# Patient Record
Sex: MALE | Employment: PART TIME | URBAN - METROPOLITAN AREA
[De-identification: names, ages, dates, MRNs, and addresses within clinical notes are randomized per-mention and may not be internally consistent; named-entity substitution may affect disease eponyms.]

---

## 2018-11-12 ENCOUNTER — OFFICE VISIT (OUTPATIENT)
Dept: URGENT CARE | Facility: CLINIC | Age: 23
End: 2018-11-12
Payer: COMMERCIAL

## 2018-11-12 VITALS
SYSTOLIC BLOOD PRESSURE: 112 MMHG | BODY MASS INDEX: 27.4 KG/M2 | DIASTOLIC BLOOD PRESSURE: 70 MMHG | RESPIRATION RATE: 16 BRPM | HEART RATE: 90 BPM | OXYGEN SATURATION: 96 % | HEIGHT: 69 IN | WEIGHT: 185 LBS | TEMPERATURE: 99.8 F

## 2018-11-12 DIAGNOSIS — B07.9 VIRAL WARTS, UNSPECIFIED TYPE: Primary | ICD-10-CM

## 2018-11-12 PROCEDURE — 99203 OFFICE O/P NEW LOW 30 MIN: CPT | Performed by: PHYSICIAN ASSISTANT

## 2018-11-12 ASSESSMENT — ENCOUNTER SYMPTOMS
CHILLS: 0
FEVER: 0
SORE THROAT: 0
VOMITING: 0
FATIGUE: 0
COUGH: 0
SHORTNESS OF BREATH: 0
WEAKNESS: 0
NUMBNESS: 0
ANOREXIA: 0
CONSTIPATION: 0
DIARRHEA: 0
NAUSEA: 0
ABDOMINAL PAIN: 0

## 2018-11-12 NOTE — PATIENT INSTRUCTIONS
Apply medication once a day with duct tape to occlude area.     Warts  Warts are small growths on the skin. They are common and can occur on various areas of the body. A person may have one wart or multiple warts. Most warts are not painful, and they usually do not cause problems. However, warts can cause pain if they are large or occur in an area of the body where pressure will be applied to them, such as the bottom of the foot.  In many cases, warts do not require treatment. They usually go away on their own over a period of many months to a couple years. Various treatments may be done for warts that cause problems or do not go away. Sometimes, warts go away and then come back again.  What are the causes?  Warts are caused by a type of virus that is called human papillomavirus (HPV). This virus can spread from person to person through direct contact. Warts can also spread to other areas of the body when a person scratches a wart and then scratches another area of his or her body.  What increases the risk?  Warts are more likely to develop in:  · People who are 10-20 years of age.  · People who have a weakened body defense system (immune system).  What are the signs or symptoms?  A wart may be round or oval or have an irregular shape. Most warts have a rough surface. Warts may range in color from skin color to light yellow, brown, or gray. They are generally less than ½ inch (1.3 cm) in size. Most warts are painless, but some can be painful when pressure is applied to them.  How is this diagnosed?  A wart can usually be diagnosed from its appearance. In some cases, a tissue sample may be removed (biopsy) to be looked at under a microscope.  How is this treated?  In many cases, warts do not need treatment. If treatment is needed, options may include:  · Applying medicated solutions, creams, or patches to the wart. These may be over-the-counter or prescription medicines that make the skin soft so that layers will  gradually shed away. In many cases, the medicine is applied one or two times per day and covered with a bandage.  · Putting duct tape over the top of the wart (occlusion). You will leave the tape in place for as long as told by your health care provider, then you will replace it with a new strip of tape. This is done until the wart goes away.  · Freezing the wart with liquid nitrogen (cryotherapy).  · Burning the wart with:  ¨ Laser treatment.  ¨ An electrified probe (electrocautery).  · Injection of a medicine (Candida antigen) into the wart to help the body's immune system to fight off the wart.  · Surgery to remove the wart.  Follow these instructions at home:  · Apply over-the-counter and prescription medicines only as told by your health care provider.  · Do not apply over-the-counter wart medicines to your face or genitals before you ask your health care provider if it is okay to do so.  · Do not scratch or pick at a wart.  · Wash your hands after you touch a wart.  · Avoid shaving hair that is over a wart.  · Keep all follow-up visits as told by your health care provider. This is important.  Contact a health care provider if:  · Your warts do not improve after treatment.  · You have redness, swelling, or pain at the site of a wart.  · You have bleeding from a wart that does not stop with light pressure.  · You have diabetes and you develop a wart.  This information is not intended to replace advice given to you by your health care provider. Make sure you discuss any questions you have with your health care provider.  Document Released: 09/27/2006 Document Revised: 05/31/2017 Document Reviewed: 03/14/2016  [employer] Interactive Patient Education © 2017 [employer]

## 2018-11-12 NOTE — PROGRESS NOTES
"Subjective:   Mandi Ortega is a 23 y.o. male who presents for Warts (pt states he noticed a bump on (R) hand x 2 months and started getting bigger)        Warts   This is a new problem. Episode onset: 2 months. The problem occurs constantly. Progression since onset: Increasing size. Pertinent negatives include no abdominal pain, anorexia, chills, congestion, coughing, fatigue, fever, nausea, numbness, rash, sore throat, vomiting or weakness. Nothing aggravates the symptoms. He has tried nothing for the symptoms.     Review of Systems   Constitutional: Negative for chills, fatigue, fever and malaise/fatigue.   HENT: Negative for congestion and sore throat.    Respiratory: Negative for cough and shortness of breath.    Gastrointestinal: Negative for abdominal pain, anorexia, constipation, diarrhea, nausea and vomiting.   Skin: Negative for rash.   Neurological: Negative for weakness and numbness.   All other systems reviewed and are negative.      PMH:  has no past medical history on file.  MEDS:   Current Outpatient Prescriptions:   •  Salicylic Acid 10 % Cream, 1 Application by Apply externally route every day for 10 days., Disp: 15 g, Rfl: 0  •  Ibuprofen 200 MG CAPS, Take  by mouth., Disp: , Rfl:   ALLERGIES: No Known Allergies  SURGHX: No past surgical history on file.  SOCHX:  reports that he has never smoked. He does not have any smokeless tobacco history on file.  No family history on file.     Objective:   /70 (BP Location: Left arm, Patient Position: Sitting, BP Cuff Size: Adult)   Pulse 90   Temp 37.7 °C (99.8 °F) (Temporal)   Resp 16   Ht 1.753 m (5' 9\")   Wt 83.9 kg (185 lb)   SpO2 96%   BMI 27.32 kg/m²     Physical Exam   Constitutional: He is oriented to person, place, and time. He appears well-developed and well-nourished. No distress.   HENT:   Head: Normocephalic and atraumatic.   Eyes: Pupils are equal, round, and reactive to light. Conjunctivae are normal.   Cardiovascular: Normal " rate and regular rhythm.    Pulmonary/Chest: Effort normal and breath sounds normal.   Musculoskeletal:        Right hand: He exhibits normal range of motion and no tenderness.        Hands:  Neurological: He is alert and oriented to person, place, and time.   Skin: Skin is warm and dry.   Psychiatric: He has a normal mood and affect. His behavior is normal.   Vitals reviewed.        Assessment/Plan:     1. Viral warts, unspecified type  Salicylic Acid 10 % Cream     Advised patient to start a trial of salicylic acid with occlusion with duct tape.  Liquid nitrogen unavailable in clinic today.  He can return to clinic if symptoms persist, consider calling ahead to clinic to find out if cryotherapy is available.  Educational handout on warts provided.    Consider asking primary care provider, follow-up with PCP 7-10 days. Red flags and emergency room precautions discussed.  Patient appears understanding of information.

## 2021-06-01 ENCOUNTER — HOSPITAL ENCOUNTER (EMERGENCY)
Dept: HOSPITAL 8 - ED | Age: 26
Discharge: HOME | End: 2021-06-01
Payer: COMMERCIAL

## 2021-06-01 VITALS — BODY MASS INDEX: 26.84 KG/M2 | HEIGHT: 69 IN | WEIGHT: 181.22 LBS

## 2021-06-01 VITALS — DIASTOLIC BLOOD PRESSURE: 69 MMHG | SYSTOLIC BLOOD PRESSURE: 129 MMHG

## 2021-06-01 DIAGNOSIS — S46.911A: Primary | ICD-10-CM
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
## 2021-06-01 PROCEDURE — 99283 EMERGENCY DEPT VISIT LOW MDM: CPT
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
## 2022-08-11 ENCOUNTER — APPOINTMENT (OUTPATIENT)
Dept: RADIOLOGY | Facility: MEDICAL CENTER | Age: 27
End: 2022-08-11
Attending: EMERGENCY MEDICINE
Payer: COMMERCIAL

## 2022-08-11 ENCOUNTER — HOSPITAL ENCOUNTER (EMERGENCY)
Facility: MEDICAL CENTER | Age: 27
End: 2022-08-11
Attending: EMERGENCY MEDICINE
Payer: COMMERCIAL

## 2022-08-11 VITALS
HEIGHT: 69 IN | WEIGHT: 185 LBS | RESPIRATION RATE: 16 BRPM | DIASTOLIC BLOOD PRESSURE: 76 MMHG | TEMPERATURE: 97 F | BODY MASS INDEX: 27.4 KG/M2 | OXYGEN SATURATION: 96 % | HEART RATE: 77 BPM | SYSTOLIC BLOOD PRESSURE: 120 MMHG

## 2022-08-11 DIAGNOSIS — S62.001A CLOSED NONDISPLACED FRACTURE OF SCAPHOID OF RIGHT WRIST, UNSPECIFIED PORTION OF SCAPHOID, INITIAL ENCOUNTER: ICD-10-CM

## 2022-08-11 DIAGNOSIS — S01.81XA FACIAL LACERATION, INITIAL ENCOUNTER: ICD-10-CM

## 2022-08-11 DIAGNOSIS — S09.90XA CLOSED HEAD INJURY, INITIAL ENCOUNTER: ICD-10-CM

## 2022-08-11 LAB
ABO + RH BLD: NORMAL
ABO GROUP BLD: NORMAL
ALBUMIN SERPL BCP-MCNC: 4.5 G/DL (ref 3.2–4.9)
ALBUMIN/GLOB SERPL: 1.9 G/DL
ALP SERPL-CCNC: 72 U/L (ref 30–99)
ALT SERPL-CCNC: 22 U/L (ref 2–50)
ANION GAP SERPL CALC-SCNC: 12 MMOL/L (ref 7–16)
APTT PPP: 24.8 SEC (ref 24.7–36)
AST SERPL-CCNC: 20 U/L (ref 12–45)
BILIRUB SERPL-MCNC: 1 MG/DL (ref 0.1–1.5)
BLD GP AB SCN SERPL QL: NORMAL
BUN SERPL-MCNC: 14 MG/DL (ref 8–22)
CALCIUM SERPL-MCNC: 9.1 MG/DL (ref 8.5–10.5)
CHLORIDE SERPL-SCNC: 105 MMOL/L (ref 96–112)
CO2 SERPL-SCNC: 20 MMOL/L (ref 20–33)
CREAT SERPL-MCNC: 1.01 MG/DL (ref 0.5–1.4)
ERYTHROCYTE [DISTWIDTH] IN BLOOD BY AUTOMATED COUNT: 38 FL (ref 35.9–50)
ETHANOL BLD-MCNC: <10.1 MG/DL
GFR SERPLBLD CREATININE-BSD FMLA CKD-EPI: 106 ML/MIN/1.73 M 2
GLOBULIN SER CALC-MCNC: 2.4 G/DL (ref 1.9–3.5)
GLUCOSE SERPL-MCNC: 123 MG/DL (ref 65–99)
HCT VFR BLD AUTO: 42.6 % (ref 42–52)
HGB BLD-MCNC: 15.3 G/DL (ref 14–18)
INR PPP: 1.09 (ref 0.87–1.13)
MCH RBC QN AUTO: 30.7 PG (ref 27–33)
MCHC RBC AUTO-ENTMCNC: 35.9 G/DL (ref 33.7–35.3)
MCV RBC AUTO: 85.5 FL (ref 81.4–97.8)
PLATELET # BLD AUTO: 289 K/UL (ref 164–446)
PMV BLD AUTO: 9.4 FL (ref 9–12.9)
POTASSIUM SERPL-SCNC: 3.8 MMOL/L (ref 3.6–5.5)
PROT SERPL-MCNC: 6.9 G/DL (ref 6–8.2)
PROTHROMBIN TIME: 14 SEC (ref 12–14.6)
RBC # BLD AUTO: 4.98 M/UL (ref 4.7–6.1)
RH BLD: NORMAL
SODIUM SERPL-SCNC: 137 MMOL/L (ref 135–145)
WBC # BLD AUTO: 7.1 K/UL (ref 4.8–10.8)

## 2022-08-11 PROCEDURE — 307740 HCHG GREEN TRAUMA TEAM SERVICES

## 2022-08-11 PROCEDURE — 71045 X-RAY EXAM CHEST 1 VIEW: CPT

## 2022-08-11 PROCEDURE — 86850 RBC ANTIBODY SCREEN: CPT

## 2022-08-11 PROCEDURE — 85730 THROMBOPLASTIN TIME PARTIAL: CPT

## 2022-08-11 PROCEDURE — 86901 BLOOD TYPING SEROLOGIC RH(D): CPT

## 2022-08-11 PROCEDURE — 72128 CT CHEST SPINE W/O DYE: CPT

## 2022-08-11 PROCEDURE — 72131 CT LUMBAR SPINE W/O DYE: CPT

## 2022-08-11 PROCEDURE — 70450 CT HEAD/BRAIN W/O DYE: CPT

## 2022-08-11 PROCEDURE — 85027 COMPLETE CBC AUTOMATED: CPT

## 2022-08-11 PROCEDURE — 700117 HCHG RX CONTRAST REV CODE 255: Performed by: EMERGENCY MEDICINE

## 2022-08-11 PROCEDURE — 71260 CT THORAX DX C+: CPT

## 2022-08-11 PROCEDURE — 36415 COLL VENOUS BLD VENIPUNCTURE: CPT

## 2022-08-11 PROCEDURE — 99285 EMERGENCY DEPT VISIT HI MDM: CPT

## 2022-08-11 PROCEDURE — 73130 X-RAY EXAM OF HAND: CPT | Mod: RT

## 2022-08-11 PROCEDURE — 85610 PROTHROMBIN TIME: CPT

## 2022-08-11 PROCEDURE — 80053 COMPREHEN METABOLIC PANEL: CPT

## 2022-08-11 PROCEDURE — 86900 BLOOD TYPING SEROLOGIC ABO: CPT

## 2022-08-11 PROCEDURE — 72125 CT NECK SPINE W/O DYE: CPT

## 2022-08-11 PROCEDURE — 82077 ASSAY SPEC XCP UR&BREATH IA: CPT

## 2022-08-11 RX ORDER — LIDOCAINE HYDROCHLORIDE 20 MG/ML
20 INJECTION, SOLUTION INFILTRATION; PERINEURAL ONCE
Status: DISCONTINUED | OUTPATIENT
Start: 2022-08-11 | End: 2022-08-11

## 2022-08-11 RX ADMIN — IOHEXOL 100 ML: 350 INJECTION, SOLUTION INTRAVENOUS at 10:00
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
## 2022-08-11 NOTE — ED NOTES
Patient left ED ambulatory and in NAD. Verbalized understanding of home monitoring, f/u information and further discharge instructions. Denies further questions at time of discharge.
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
## 2022-08-11 NOTE — DISCHARGE INSTRUCTIONS
Follow-up with orthopedics next week for reevaluation and definitive treatment of right scaphoid fracture.  Call 's office tomorrow, reference this emergency department visit and schedule an appointment for follow-up next week.  Follow-up with primary care or ED next week for reevaluation and wound check.    Keep splint clean and dry.  Nonweightbearing right upper extremity.  Rest, ice, elevate as needed for swelling or discomfort.    Keep wounds clean and dry.  Cleanse gently with warm water, soap, pat dry.  Avoid soaking wounds in water.  Antibiotic ointment twice daily.  Keep covered while outdoors are active.    Tylenol or ibuprofen as needed for discomfort.    Return to the emergency department for headache, altered mental status, visual changes, seizure, focal weakness, wound infection, hand/arm pain/swelling/discoloration/paresthesias or other new concerns.

## 2023-02-14 ENCOUNTER — OFFICE VISIT (OUTPATIENT)
Dept: URGENT CARE | Facility: CLINIC | Age: 28
End: 2023-02-14
Payer: COMMERCIAL

## 2023-02-14 VITALS
SYSTOLIC BLOOD PRESSURE: 102 MMHG | RESPIRATION RATE: 14 BRPM | HEIGHT: 69 IN | DIASTOLIC BLOOD PRESSURE: 68 MMHG | HEART RATE: 57 BPM | TEMPERATURE: 97.6 F | WEIGHT: 162 LBS | BODY MASS INDEX: 23.99 KG/M2 | OXYGEN SATURATION: 99 %

## 2023-02-14 DIAGNOSIS — K92.1 BLOODY STOOLS: ICD-10-CM

## 2023-02-14 PROCEDURE — 99203 OFFICE O/P NEW LOW 30 MIN: CPT | Performed by: PHYSICIAN ASSISTANT

## 2023-02-14 ASSESSMENT — FIBROSIS 4 INDEX: FIB4 SCORE: 0.37

## 2023-02-15 NOTE — PROGRESS NOTES
"Subjective:   Mandi Ortega is a 27 y.o. male who presents for Bloody Stools (X yesterday. )      HPI  The patient presents to the Urgent Care with complaints of blood in stool onset yesterday.  Blood in stool only when he is having a bowel movement.  He has had 2 episodes.  Did not notice a lot of blood in the toilet bowl water but on the stool.  Denies any constant bleeding or bleeding in the underwear.  He has no pain.  Denies any anal or rectal pain.  His stool was somewhat more hard than usual and he had somewhat straining this morning.  He did lift weights last week but does not usually lift weights. Denies any fever, abdominal pain, nausea, vomiting, urinary issues.  Denies any history of colitis or diverticulitis.  Tolerating fluids.  Normal appetite.          Medications:    escitalopram Tabs  Ibuprofen Caps    Allergies: Patient has no known allergies.    Problem List: Mandi Ortega does not have a problem list on file.    Surgical History:  No past surgical history on file.    Past Social Hx: Mandi Ortega  reports that he has never smoked. He has never used smokeless tobacco. He reports current alcohol use. He reports that he does not currently use drugs.     Past Family Hx:  Mandi Ortega family history is not on file.     Problem list, medications, and allergies reviewed by myself today in Epic.     Objective:     /68   Pulse (!) 57   Temp 36.4 °C (97.6 °F) (Temporal)   Resp 14   Ht 1.753 m (5' 9\")   Wt 73.5 kg (162 lb)   SpO2 99%   BMI 23.92 kg/m²     Physical Exam  Vitals reviewed.   Constitutional:       General: He is not in acute distress.     Appearance: Normal appearance. He is not ill-appearing or toxic-appearing.   Eyes:      Conjunctiva/sclera: Conjunctivae normal.      Pupils: Pupils are equal, round, and reactive to light.   Cardiovascular:      Rate and Rhythm: Normal rate.   Pulmonary:      Effort: Pulmonary effort is normal.   Abdominal:      General: Abdomen is " flat. Bowel sounds are normal. There is no distension.      Palpations: Abdomen is soft. There is no mass.      Tenderness: There is no abdominal tenderness. There is no guarding or rebound.   Genitourinary:     Prostate: Normal.      Rectum: Internal hemorrhoid (possible internal hemorrhoid palpated. no pain. no visible blood from digital rectal exam) present. No mass, tenderness, anal fissure or external hemorrhoid. Normal anal tone.   Musculoskeletal:      Cervical back: Neck supple.   Skin:     General: Skin is warm and dry.   Neurological:      General: No focal deficit present.      Mental Status: He is alert and oriented to person, place, and time.   Psychiatric:         Mood and Affect: Mood normal.         Behavior: Behavior normal.       Diagnosis and associated orders:     1. Bloody stools       Comments/MDM:     Discussed with patient his presenting symptoms and exam findings most likely due to internal hemorrhoid.  He is well-appearing in no acute distress.  Normal vital signs.  No abdominal pain.  No anal fissures or external thrombosed hemorrhoids.  Recommend symptomatic and supportive care at this time.  Increase fluid intake, increase fiber intake, stool softener, avoid straining or lifting.  If no improvement in 1 week patient is going to message me on [employer] for further instructions, possible labs, and possible referral to gastroenterology.  Otherwise, patient agreed to present to the ER if any worsening bleeding or any other concerns.       I personally reviewed prior external notes and test results pertinent to today's visit. Pathogenesis of diagnosis discussed including typical length and natural progression. Supportive care, natural history, differential diagnoses, and indications for immediate follow-up discussed. Patient expresses understanding and agrees to plan. Patient denies any other questions or concerns.     Follow-up with the primary care physician for recheck, reevaluation, and  consideration of further management.    Please note that this dictation was created using voice recognition software. I have made a reasonable attempt to correct obvious errors, but I expect that there are errors of grammar and possibly content that I did not discover before finalizing the note.    This note was electronically signed by Kurtis Angel PA-C

## 2024-04-09 ENCOUNTER — HOSPITAL ENCOUNTER (EMERGENCY)
Facility: MEDICAL CENTER | Age: 29
End: 2024-04-09
Attending: EMERGENCY MEDICINE
Payer: COMMERCIAL

## 2024-04-09 VITALS
SYSTOLIC BLOOD PRESSURE: 124 MMHG | BODY MASS INDEX: 23.02 KG/M2 | HEART RATE: 55 BPM | RESPIRATION RATE: 18 BRPM | WEIGHT: 155.87 LBS | OXYGEN SATURATION: 98 % | TEMPERATURE: 98.1 F | DIASTOLIC BLOOD PRESSURE: 76 MMHG

## 2024-04-09 DIAGNOSIS — Z76.0 MEDICATION REFILL: ICD-10-CM

## 2024-04-09 PROCEDURE — 99282 EMERGENCY DEPT VISIT SF MDM: CPT

## 2024-04-09 RX ORDER — ESCITALOPRAM OXALATE 10 MG/1
10 TABLET ORAL DAILY
Qty: 30 TABLET | Refills: 0 | Status: SHIPPED | OUTPATIENT
Start: 2024-04-09 | End: 2024-04-22 | Stop reason: SDUPTHER

## 2024-04-09 ASSESSMENT — FIBROSIS 4 INDEX: FIB4 SCORE: 0.38

## 2024-04-09 NOTE — ED TRIAGE NOTES
.Mandi Ortega  .  Chief Complaint   Patient presents with    Medication Refill     10mg lexapro     Patient to triage with above complaint. Patient recently changed insurance and has appt next month with new psychiatrist. Last dose was yesterday.     Patient to lobby and instructed to inform staff of any needs.

## 2024-04-09 NOTE — ED PROVIDER NOTES
ED Provider Note    CHIEF COMPLAINT  Chief Complaint   Patient presents with    Medication Refill     10mg lexapro         HPI/ROS  LIMITATION TO HISTORY   Select: : None  OUTSIDE HISTORIAN(S):  None    Mandi Ortega is a 28 y.o. male who presents requesting refill of Lexapro.  His last dose was today.  No acute complaint otherwise.  He states his doctor's appointment is in 2 weeks.    PAST MEDICAL HISTORY       SURGICAL HISTORY  patient denies any surgical history    FAMILY HISTORY  History reviewed. No pertinent family history.    SOCIAL HISTORY  Social History     Tobacco Use    Smoking status: Never    Smokeless tobacco: Never   Vaping Use    Vaping Use: Never used   Substance and Sexual Activity    Alcohol use: Yes     Comment: occ    Drug use: Yes     Types: Inhaled     Comment: marijuana    Sexual activity: Not on file       CURRENT MEDICATIONS  Home Medications       Reviewed by Deepthi Paulson R.N. (Registered Nurse) on 04/09/24 at 1529  Med List Status: Complete     Medication Last Dose Status   escitalopram (LEXAPRO) 10 MG Tab 4/8/2024 Active   Ibuprofen 200 MG CAPS  Active                    ALLERGIES  No Known Allergies    PHYSICAL EXAM  VITAL SIGNS: /67   Pulse (!) 54   Temp 35.9 °C (96.7 °F) (Temporal)   Resp 17   Wt 70.7 kg (155 lb 13.8 oz)   SpO2 99%   BMI 23.02 kg/m²    Neurologic: Speech clear, ambulates without assistance  Psychiatric: Normal mood.  Normal affect.  Cardiac: Bradycardic rate, regular rhythm  Respiratory: Clear lung sounds          COURSE & MEDICAL DECISION MAKING    ASSESSMENT, COURSE AND PLAN  Care Narrative: Patient without acute medical complaint at this time, last dose of his medication Lexapro was today and he is not able to see his doctor until 2 weeks.  From a psychiatric standpoint, he appears stable at this time, no complaints of suicidal or homicidal ideation.  Vital signs are normal, patient stable for discharge.    DISPOSITION AND  DISCUSSIONS    Escalation of care considered, and ultimately not performed:Laboratory analysis    Barriers to care at this time, including but not limited to:  Patient is unable to get an appointment with his primary doctor for the next 2 weeks. .     Decision tools and prescription drugs considered including, but not limited to: Medication modification no change in his medication dosage .    FINAL DIAGNOSIS  1. Medication refill           Electronically signed by: Nikhil Carrasquillo M.D., 4/9/2024 4:10 PM

## 2024-04-09 NOTE — ED NOTES
Discharge teaching and paperwork provided regarding treatments and all questions/concerns answered. VSS, assessment stable and pt was given information regarding home care and reasons to follow up with ED or primary MD. Patient provided Rx to  at their desired pharmacy. Patient discharged to the care of self and home out of the ED.

## 2024-04-22 ENCOUNTER — DOCUMENTATION (OUTPATIENT)
Dept: BEHAVIORAL HEALTH | Facility: CLINIC | Age: 29
End: 2024-04-22
Payer: COMMERCIAL

## 2024-04-22 ENCOUNTER — OFFICE VISIT (OUTPATIENT)
Dept: BEHAVIORAL HEALTH | Facility: CLINIC | Age: 29
End: 2024-04-22
Payer: COMMERCIAL

## 2024-04-22 VITALS
HEART RATE: 55 BPM | HEIGHT: 69 IN | WEIGHT: 153.8 LBS | SYSTOLIC BLOOD PRESSURE: 110 MMHG | OXYGEN SATURATION: 97 % | DIASTOLIC BLOOD PRESSURE: 64 MMHG | BODY MASS INDEX: 22.78 KG/M2

## 2024-04-22 DIAGNOSIS — F90.0 ADHD (ATTENTION DEFICIT HYPERACTIVITY DISORDER), INATTENTIVE TYPE: ICD-10-CM

## 2024-04-22 DIAGNOSIS — Z76.0 MEDICATION REFILL: ICD-10-CM

## 2024-04-22 DIAGNOSIS — F40.10 SOCIAL ANXIETY DISORDER: ICD-10-CM

## 2024-04-22 PROCEDURE — 3078F DIAST BP <80 MM HG: CPT | Performed by: PSYCHIATRY & NEUROLOGY

## 2024-04-22 PROCEDURE — 3074F SYST BP LT 130 MM HG: CPT | Performed by: PSYCHIATRY & NEUROLOGY

## 2024-04-22 PROCEDURE — 99204 OFFICE O/P NEW MOD 45 MIN: CPT | Performed by: PSYCHIATRY & NEUROLOGY

## 2024-04-22 RX ORDER — METHYLPHENIDATE HYDROCHLORIDE 18 MG/1
18 TABLET ORAL EVERY MORNING
Qty: 30 TABLET | Refills: 0 | Status: SHIPPED | OUTPATIENT
Start: 2024-04-22 | End: 2024-05-22

## 2024-04-22 RX ORDER — ESCITALOPRAM OXALATE 10 MG/1
10 TABLET ORAL DAILY
Qty: 30 TABLET | Refills: 1 | Status: SHIPPED | OUTPATIENT
Start: 2024-04-22

## 2024-04-22 ASSESSMENT — FIBROSIS 4 INDEX: FIB4 SCORE: 0.38

## 2024-04-22 NOTE — PROGRESS NOTES
INITIAL PSYCHIATRY EVALUATION        History Of Present Illness:  Mandi Ortega is a 28 y.o. male with history of depression referred by PCP comes in today to establish care and for evaluation of anxiety, was receiving care through [facility], prescribed lexapro 10mg for anxiety from Feb 2020 until Dec 2023 but now needs to establish care b/c his insurance dropped [facility].      Dx anxiety in 2020 but has had symptoms since childhood:  butterflies in his stomach, can't think straight when nervous, heart racing, worse when talking to new people, social settings, before sports practice or events for class, performance anxiety.  No h/o panic attacks. Worse symptoms in high school, would avoid football practice.      Depression: Worse in Wintertime, seasonal, longest duration is 2wk: hopeless, lost, feels he doesn't know what he's doing in life, anergia, no passion for doing things he needs to do, anhedonia.  Happens every year, comes and goes throughout the Winter.     ADHD:  never dx but fiddles a lot, difficulty focusing on certain days, grades were a struggle in middle and high school, but in college he developed a passion for education, goofed off in class, does not recall being hyper,  inattentive, often has 'lots of thoughts' in his head that aren't anxious :  needing to do certain tasks getting easily distracted etc.  (See chart)    Current psychiatric medications - Lexapro 10mg qam    Past Psychiatric History:  Denies history of suicide attempt or prior inpatient psychiatry hospitalization.  Prior diagnosis - Anxiety  Provider - Dominique Max at [facility] - x 1.5 years working on getting better at socializing, CBT  Prior psychiatric hospitalization - denies  Self harm/suicide attempt - denies  Previous medication trials - denies    Safety Assessment-Self:  Does patient acknowledge current or past symptoms of dangerousness to self? no  Does parent/significant other report patient has  current or past symptoms of dangerousness to self? no  Does presenting problem suggest symptoms of dangerousness to self? No    Safety Assessment-Others:  Does patient acknowledge current or past symptoms of aggressive behavior or risk to others? no  Does parent/significant other report patient has current or past symptoms of aggressive behavior or risk to others?  no  Does presenting problem suggest symptoms of dangerousness to others? No     Current Safety/Relapse Assessment:       Suicidal: Low       Homicidal: Low       Self-Harm: Low       Relapse: Not applicable       Crisis Safety Plan: n/a  Reviewed Not Indicated    Past Medical/Surgical History:  No past medical history on file.  No past surgical history on file.    Family Psychiatric History:  Anxiety:  panic attacks (father); brother;   Depression: (father, mother, brother)  Bipolar denies  Schizophrenia denies  ADHD denies  Substance use: father in recovery for alcoholism and pain killers    Substance Use/Addiction History:  Alcohol - last night, drinks once every 1-2 months  Nicotine - denies  Cannabis - daily after work, helps him unwind. First age 10th grade, heaviest use was 2 yr ago and was smoking all day.   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 Has worked other jobs: , [employer] and side jobs.  Attends [employer] jonatan after transferring from [employer], majoring in history, starts in the Fall.  [employer], where he grew up.  AA from [employer].  Unsure of his future career plans, maybe teaching high school or college.  Parents  when he was 15. Brother is 19, lives together, working at [employer]. Single. Heterosexual.  No children.  Fa in [place], mom is in [facility]. Does not have any friends, except for his brother, no problems making new friends.  Travels a  "lot and stays at hostels.    Allergies:  Patient has no known allergies.    Medications:  Current Outpatient Medications   Medication Sig Dispense Refill    escitalopram (LEXAPRO) 10 MG Tab Take 1 Tablet by mouth every day. 30 Tablet 0    Ibuprofen 200 MG CAPS Take  by mouth.       No current facility-administered medications for this visit.       Review of Symptoms:  Constitutional - Positive for fatigue  Psychiatric - Positive for anxiety    Physical Examination:  Vital signs: /64 (BP Location: Right arm, Patient Position: Sitting)   Pulse (!) 55   Ht 1.753 m (5' 9\")   Wt 69.8 kg (153 lb 12.8 oz)   SpO2 97%   BMI 22.71 kg/m²     Musculoskeletal: Normal gait. No abnormal movements.     Mental Status Evaluation:   General:, dressed in casual attire, good grooming and hygiene, in no apparent distress, calm and cooperative, good eye contact, no psychomotor agitation or retardation  Orientation: Alert and oriented to person, place and time  Recent and remote memory: Intact  Attention span and concentration: Intact  Speech: Spontaneous, normal rate, rhythm and tone  Thought Process: Linear, logical and goal directed  Thought Content: Denies suicidal or homicidal ideations, intent or plan  Perception: Denies auditory or visual hallucinations. No delusions noted  Associations: Intact  Language: Appropriate  Fund of knowledge and vocabulary: Adequate  Mood: \"good\"  Affect: full, mood congruent  Insight: Good  Judgment: Good    Depression screening:       No data to display                Interpretation of PHQ-9 Total Score   Score Severity   1-4 No Depression   5-9 Mild Depression   10-14 Moderate Depression   15-19 Moderately Severe Depression   20-27 Severe Depression    Anxiety screening:       No data to display                Interpretation of ANNI 7 Total Score   Score Severity:  0-4 No Anxiety   5-9 Mild Anxiety  10-14 Moderate Anxiety  15-21 Severe Anxiety    Medical Records/Labs/Diagnostic Tests " Reviewed:  [place] PDMP records - reviewed    Impression:  1.  29 y/o male with social anxiety disorder presents with good response to cognitive behavioral therapy and treatment with escitalopram.  Also meets criteria for ADHD IT given ADHD adult screen and history.   Bilateral family history of depression with resulting seasonal depressed mood which might benefit from intervention (raise escitalopram dose for Winter months), and paternal FH of anxiety and substance abuse, place patient at higher risk of these conditions.  Socially, tends to avoid social gatherings as is consistent with his anxiety disorder but has good insight and is working on this in therapy.     Plan:  1.  Cont escitalopram 10mg qd for social anxiety  2. Start Concerta 18mg qam ADHD IT  3. Cont CBT at [facility]  4. Encouraged pt to get a primary care provider who can likely prescribe his medication moving forward.  5. Labs - ordered baseline labs  6. RTC 1 mo    Return to clinic in 1 mo or sooner if symptoms worsen    The proposed treatment plan was discussed with the patient who was provided the opportunity to ask questions and make suggestions regarding alternative treatment. Patient verbalized understanding and expressed agreement with the plan.     Total time spent on the day of encounter: 65 minutes.     Thank you for allowing me to participate in the care of this patient.    Amber Pandya M.D.  04/22/24    CC:   Pcp Pt States None

## 2024-05-20 ENCOUNTER — OFFICE VISIT (OUTPATIENT)
Dept: BEHAVIORAL HEALTH | Facility: CLINIC | Age: 29
End: 2024-05-20
Payer: COMMERCIAL

## 2024-05-20 VITALS
HEART RATE: 61 BPM | DIASTOLIC BLOOD PRESSURE: 74 MMHG | BODY MASS INDEX: 22.03 KG/M2 | OXYGEN SATURATION: 100 % | WEIGHT: 149.2 LBS | SYSTOLIC BLOOD PRESSURE: 102 MMHG

## 2024-05-20 DIAGNOSIS — F90.0 ADHD (ATTENTION DEFICIT HYPERACTIVITY DISORDER), INATTENTIVE TYPE: ICD-10-CM

## 2024-05-20 DIAGNOSIS — R42 ORTHOSTATIC DIZZINESS: ICD-10-CM

## 2024-05-20 DIAGNOSIS — Z72.4 EATING PROBLEM: ICD-10-CM

## 2024-05-20 PROCEDURE — 3078F DIAST BP <80 MM HG: CPT | Performed by: PSYCHIATRY & NEUROLOGY

## 2024-05-20 PROCEDURE — 3074F SYST BP LT 130 MM HG: CPT | Performed by: PSYCHIATRY & NEUROLOGY

## 2024-05-20 PROCEDURE — 99214 OFFICE O/P EST MOD 30 MIN: CPT | Performed by: PSYCHIATRY & NEUROLOGY

## 2024-05-20 RX ORDER — METHYLPHENIDATE HYDROCHLORIDE 18 MG/1
18 TABLET ORAL EVERY MORNING
Qty: 30 TABLET | Refills: 0 | Status: SHIPPED | OUTPATIENT
Start: 2024-05-20 | End: 2024-05-21 | Stop reason: SDUPTHER

## 2024-05-20 ASSESSMENT — FIBROSIS 4 INDEX: FIB4 SCORE: 0.38

## 2024-05-20 NOTE — PROGRESS NOTES
PSYCHIATRY FOLLOW-UP NOTE    No chief complaint on file.      History Of Present Illness:  Mandi is a 27 y/o male with social anxiety disorder and ADHD IT last seen 4w ago, presents for f/u.  Taking Concerta daily, noticing improved energy, and possible increase in focus but very subtle, days when he still has a hard time focusing.  Still working at the [employer] daily, more motivated to get stuff done, is running now 3 miles a day and really likes it (used to run track in high school), cooking for himself more and meal prepping, no longer sitting at home and getting outdoors.  School starts the last week of August.  Sleeping 'pretty good' approx 8 hr sleep.  Appetite is lower, had stomach aches upon starting (149.2# today, was 155# about one mo ago).     Spends a lot of time thinking about his body, worries about what he eats, tries to eat health, worries about 'cheat meals.'  Never saw a dietitian. Has had experience of excessive exercise after eating.  No syncopal episodes.  Was obese as a highschooler, and worries about returning to being overweight.     Social History:   No changes    Substance Use:  Alcohol - last 5/19/24, one white claw, on average one drink per few weeks  Nicotine - stopped vaping, threw away all weed vapes  Cannabis - gummy once a week  Illicit drugs - denies    Past Medication Trials:  See chart    Medications:  Current Outpatient Medications   Medication Sig Dispense Refill    escitalopram (LEXAPRO) 10 MG Tab Take 1 Tablet by mouth every day. 30 Tablet 1    methylphenidate (CONCERTA) 18 MG CR tablet Take 1 Tablet by mouth every morning for 30 days. Indications: Attention Deficit Hyperactivity Disorder 30 Tablet 0    Ibuprofen 200 MG CAPS Take  by mouth.       No current facility-administered medications for this visit.       Review Of Systems:    Constitutional - Positive for fatigue  Psychiatric - Positive for mild anxiety    Physical Examination:  Vital signs: There were no vitals  "taken for this visit.    Musculoskeletal: Normal gait. No abnormal movements.     Mental Status Evaluation:   General:  dressed in casual attire, good grooming and hygiene, in no apparent distress, calm and cooperative, good eye contact, no psychomotor agitation or retardation  Orientation: Alert and oriented to person, place and time  Recent and remote memory: Grossly intact  Attention span and concentration: Grossly intact  Speech: Spontaneous, normal rate, rhythm and tone  Thought Process: Linear, logical and goal directed  Thought Content: Denies suicidal or homicidal ideations, intent or plan  Perception: Denies auditory or visual hallucinations. No delusions noted  Associations: Intact  Language: Appropriate  Fund of knowledge and vocabulary: Grossly adequate  Mood: \"good\"  Affect: full, mood congruent  Insight: Good  Judgment: Good    Depression screening:       No data to display                Interpretation of PHQ-9 Total Score   Score Severity   1-4 No Depression   5-9 Mild Depression   10-14 Moderate Depression   15-19 Moderately Severe Depression   20-27 Severe Depression    Anxiety screening:       No data to display                Interpretation of ANNI 7 Total Score   Score Severity:  0-4 No Anxiety   5-9 Mild Anxiety  10-14 Moderate Anxiety  15-21 Severe Anxiety    Medical Records/Labs/Diagnostic Tests Reviewed:  [place] [facility] records - reviewed      Impression:  Jennifer Raymond is a 27 y/o male with social anxiety disorder and ADHD IT with anxiety slightly increased from starting concerta but manageable and has improved with time.  ADHD sx of procrastination and amotivation have improved with concerta.  Will cont to watch given h/o body image struggles with recent 6# weight loss to ensure avoidance of eating disordered behaviors.    Plan:  1. Cont lexapro 10mg qd and concerta 18mg qd  2. Labs - will go to any [facility]  3. Dietitian - will refer via [facility]  4. Therapy - cont with current provider, talk with her " about history of body image, teasing and how much time he spends thinking about body/food.   5. RTC 4 wk    Return to clinic in 4 wk or sooner if symptoms worsen    The proposed treatment plan was discussed with the patient who was provided the opportunity to ask questions and make suggestions regarding alternative treatment. Patient verbalized understanding and expressed agreement with the plan.     Total time spent on the day of encounter: 38 minutes.     Amber Pandya M.D.  05/20/24    This note was created using voice recognition software (Dragon). The accuracy of the dictation is limited by the abilities of the software. I have reviewed the note prior to signing, however some errors in grammar and context are still possible. If you have any questions related to this note please do not hesitate to contact our office.

## 2024-05-21 DIAGNOSIS — F90.0 ADHD (ATTENTION DEFICIT HYPERACTIVITY DISORDER), INATTENTIVE TYPE: ICD-10-CM

## 2024-05-21 RX ORDER — METHYLPHENIDATE HYDROCHLORIDE 18 MG/1
18 TABLET ORAL EVERY MORNING
Qty: 30 TABLET | Refills: 0 | Status: SHIPPED | OUTPATIENT
Start: 2024-05-22 | End: 2024-06-21

## 2024-05-21 NOTE — TELEPHONE ENCOUNTER
Sally Pandya pt. NGUYEN is not showing as registered with the state. Pharmacy would like a new script sent to process prescription. Can you please send it?    Received request via: Pharmacy    Was the patient seen in the last year in this department? Yes    Does the patient have an active prescription (recently filled or refills available) for medication(s) requested? No    Pharmacy Name: [facility]    Does the patient have nursing home Plus and need 100 day supply (blood pressure, diabetes and cholesterol meds only)? Medication is not for cholesterol, blood pressure or diabetes and Patient does not have SCP

## 2024-06-17 ENCOUNTER — APPOINTMENT (OUTPATIENT)
Dept: BEHAVIORAL HEALTH | Facility: CLINIC | Age: 29
End: 2024-06-17
Payer: COMMERCIAL

## 2024-06-24 ENCOUNTER — OFFICE VISIT (OUTPATIENT)
Dept: BEHAVIORAL HEALTH | Facility: CLINIC | Age: 29
End: 2024-06-24
Payer: COMMERCIAL

## 2024-06-24 VITALS
SYSTOLIC BLOOD PRESSURE: 105 MMHG | OXYGEN SATURATION: 99 % | BODY MASS INDEX: 22.48 KG/M2 | HEART RATE: 71 BPM | DIASTOLIC BLOOD PRESSURE: 71 MMHG | WEIGHT: 152.2 LBS

## 2024-06-24 DIAGNOSIS — Z76.0 MEDICATION REFILL: ICD-10-CM

## 2024-06-24 PROCEDURE — 3078F DIAST BP <80 MM HG: CPT | Performed by: PSYCHIATRY & NEUROLOGY

## 2024-06-24 PROCEDURE — 99214 OFFICE O/P EST MOD 30 MIN: CPT | Performed by: PSYCHIATRY & NEUROLOGY

## 2024-06-24 PROCEDURE — 3074F SYST BP LT 130 MM HG: CPT | Performed by: PSYCHIATRY & NEUROLOGY

## 2024-06-24 RX ORDER — METHYLPHENIDATE HYDROCHLORIDE 18 MG/1
18 TABLET ORAL EVERY MORNING
Qty: 30 TABLET | Refills: 0 | Status: SHIPPED | OUTPATIENT
Start: 2024-06-24 | End: 2024-07-24

## 2024-06-24 RX ORDER — METHYLPHENIDATE HYDROCHLORIDE 18 MG/1
18 TABLET ORAL EVERY MORNING
Qty: 30 TABLET | Refills: 0 | Status: SHIPPED | OUTPATIENT
Start: 2024-07-24 | End: 2024-08-23

## 2024-06-24 RX ORDER — ESCITALOPRAM OXALATE 10 MG/1
10 TABLET ORAL DAILY
Qty: 90 EACH | Refills: 1 | Status: SHIPPED | OUTPATIENT
Start: 2024-06-24

## 2024-06-24 ASSESSMENT — FIBROSIS 4 INDEX: FIB4 SCORE: 0.38

## 2024-06-24 NOTE — PROGRESS NOTES
PSYCHIATRY FOLLOW-UP NOTE    No chief complaint on file.      History Of Present Illness:  Mandi Ortega is a 28 y.o. male with ADHD, social anxiety comes in today for follow up, was last seen 1 ago.  Since last visit, has gained about 4 #, is exercising 6x per week, and runs every other day, is not preoccupied with food/eating, allows one cheat day per week.  Denies counting calories or restricting, denies use of laxatives or diuretics, or bingeing. Running about 2 miles q o d.    ADHD symptoms improved with Concerta, he notices a significant difference when he is not taking the medication, mind wanders less.  Working still, 65 hrs last week.  Sleeping 'pretty good.'  Appetite is low, but intentionally eating so he does not lose weight.     Anxiety is better then it was last month, but overall it comes and goes. When he experiences anxiety c/o butterflies in stomach, difficulty organizing his thoughts, triggered by random events but used to be related to performance.     Social History:   Living with brother, working, getting along well with sibling.     Substance Use:  Alcohol - denies  Nicotine - occasional, q o day  Cannabis - occasional q o d  Illicit drugs - denies    Past Medication Trials:  See chart    Medications:  Current Outpatient Medications   Medication Sig Dispense Refill    escitalopram (LEXAPRO) 10 MG Tab Take 1 Tablet by mouth every day. 30 Tablet 1    Ibuprofen 200 MG CAPS Take  by mouth.       No current facility-administered medications for this visit.       Review Of Systems:    Constitutional - Positive for fatigue  Psychiatric - Positive for anxiety, ADHD.    Physical Examination:  Vital signs: There were no vitals taken for this visit.    Musculoskeletal: Normal gait. No abnormal movements.     Mental Status Evaluation:   General:  dressed in casual attire, good grooming and hygiene, in no apparent distress, calm and cooperative, good eye contact, no psychomotor agitation or  "retardation  Orientation: Alert and oriented to person, place and time  Recent and remote memory: Grossly intact  Attention span and concentration: Grossly intact  Speech: Spontaneous, normal rate, rhythm and tone  Thought Process: Linear, logical and goal directed  Thought Content: Denies suicidal or homicidal ideations, intent or plan  Perception: Denies auditory or visual hallucinations. No delusions noted  Associations: Intact  Language: Appropriate  Fund of knowledge and vocabulary: Grossly adequate  Mood: \"pretty good\"  Affect: full, mood congruent  Insight: Good  Judgment: Good    Depression screening:       No data to display                Interpretation of PHQ-9 Total Score   Score Severity   1-4 No Depression   5-9 Mild Depression   10-14 Moderate Depression   15-19 Moderately Severe Depression   20-27 Severe Depression    Anxiety screening:       No data to display                Interpretation of ANNI 7 Total Score   Score Severity:  0-4 No Anxiety   5-9 Mild Anxiety  10-14 Moderate Anxiety  15-21 Severe Anxiety    Medical Records/Labs/Diagnostic Tests Reviewed:  [place] [facility] records - reviewed      Impression:  1Aguila Raymond is a 27 y/o male with social anxiety disorder and ADHD IT doing well with current regimen of medication and therapy.   ADHD sx of procrastination and amotivation have improved with concerta.  Will cont to watch given h/o body image struggles.     Plan:  1. Cont lexapro 10mg qd and concerta 18mg qd.  Will send in Rx for 2 mo sufficient.  2. Labs - will go to any [facility], pt forgot but will try to go before next appt  3. Dietitian - will refer via [employer]  4. Therapy - cont with Dominique Max q o week, they did speak about his past body image struggles and will cont to monitor, no acute concerns at this time.   5. RTC 4 wk    Return to clinic in 2 mo or sooner if symptoms worsen, will transfer case to resident physician on Monday mornings and writer will supervise.     The proposed treatment plan " was discussed with the patient who was provided the opportunity to ask questions and make suggestions regarding alternative treatment. Patient verbalized understanding and expressed agreement with the plan.     Total time spent on the day of encounter: 30 minutes.     Amber Pandya M.D.  06/24/24    This note was created using voice recognition software (Dragon). The accuracy of the dictation is limited by the abilities of the software. I have reviewed the note prior to signing, however some errors in grammar and context are still possible. If you have any questions related to this note please do not hesitate to contact our office.

## 2024-08-05 ENCOUNTER — OFFICE VISIT (OUTPATIENT)
Dept: BEHAVIORAL HEALTH | Facility: CLINIC | Age: 29
End: 2024-08-05
Payer: COMMERCIAL

## 2024-08-05 DIAGNOSIS — F90.0 ADHD (ATTENTION DEFICIT HYPERACTIVITY DISORDER), INATTENTIVE TYPE: ICD-10-CM

## 2024-08-05 DIAGNOSIS — F40.10 SOCIAL ANXIETY DISORDER: ICD-10-CM

## 2024-08-05 DIAGNOSIS — Z76.0 MEDICATION REFILL: ICD-10-CM

## 2024-08-05 PROCEDURE — 99214 OFFICE O/P EST MOD 30 MIN: CPT | Mod: GC

## 2024-08-05 RX ORDER — ESCITALOPRAM OXALATE 10 MG/1
10 TABLET ORAL DAILY
Qty: 90 TABLET | Refills: 1 | Status: SHIPPED | OUTPATIENT
Start: 2024-08-05

## 2024-08-05 RX ORDER — METHYLPHENIDATE HYDROCHLORIDE 18 MG/1
18 TABLET ORAL EVERY MORNING
Qty: 30 TABLET | Refills: 0 | Status: SHIPPED | OUTPATIENT
Start: 2024-09-04 | End: 2024-10-04

## 2024-08-05 RX ORDER — METHYLPHENIDATE HYDROCHLORIDE 18 MG/1
18 TABLET ORAL EVERY MORNING
Qty: 30 TABLET | Refills: 0 | Status: SHIPPED | OUTPATIENT
Start: 2024-08-05 | End: 2024-09-04

## 2024-08-05 ASSESSMENT — ENCOUNTER SYMPTOMS
SHORTNESS OF BREATH: 0
GASTROINTESTINAL NEGATIVE: 1
CONSTITUTIONAL NEGATIVE: 1
NAUSEA: 0
RESPIRATORY NEGATIVE: 1
CONSTIPATION: 0
VOMITING: 0
NEUROLOGICAL NEGATIVE: 1
CARDIOVASCULAR NEGATIVE: 1
DIARRHEA: 0
HEADACHES: 0
WEAKNESS: 0
TINGLING: 0

## 2024-08-05 ASSESSMENT — PATIENT HEALTH QUESTIONNAIRE - PHQ9
SUM OF ALL RESPONSES TO PHQ QUESTIONS 1-9: 8
CLINICAL INTERPRETATION OF PHQ2 SCORE: 2
5. POOR APPETITE OR OVEREATING: 0 - NOT AT ALL

## 2024-08-05 ASSESSMENT — ANXIETY QUESTIONNAIRES
1. FEELING NERVOUS, ANXIOUS, OR ON EDGE: NOT AT ALL
4. TROUBLE RELAXING: MORE THAN HALF THE DAYS
6. BECOMING EASILY ANNOYED OR IRRITABLE: SEVERAL DAYS
3. WORRYING TOO MUCH ABOUT DIFFERENT THINGS: SEVERAL DAYS
GAD7 TOTAL SCORE: 5
5. BEING SO RESTLESS THAT IT IS HARD TO SIT STILL: NOT AT ALL
2. NOT BEING ABLE TO STOP OR CONTROL WORRYING: SEVERAL DAYS
7. FEELING AFRAID AS IF SOMETHING AWFUL MIGHT HAPPEN: NOT AT ALL
IF YOU CHECKED OFF ANY PROBLEMS ON THIS QUESTIONNAIRE, HOW DIFFICULT HAVE THESE PROBLEMS MADE IT FOR YOU TO DO YOUR WORK, TAKE CARE OF THINGS AT HOME, OR GET ALONG WITH OTHER PEOPLE: SOMEWHAT DIFFICULT

## 2024-08-05 NOTE — PROGRESS NOTES
[facility] OUTPATIENT  PSYCHIATRIC EVALUATION    A full psychiatric evaluation was performed due to transition of care to new resident/fellow physician. All elements of a psychiatric evaluation were reviewed to ensure safe and effective care with transition.     Evaluation completed by: Delbert Eisenberg M.D.   Date of Service: 08/05/2024  Appointment type: in-office appointment.  Attending:  Amber Pandya M.D.   Information below was collected from: patient    CHIEF COMPLIANT:  Psychiatric Evaluation (Transfer of care)      HPI:   Mandi Ortega is a 28 y.o. old male with hx of social anxiety, ADHD IT, and seasonal affective d/o who presents today for new psychiatric evaluation for the assessment of Psychiatric Evaluation (Transfer of care)    Since last appointment with Dr. Pandya on 6/24/24, patient reports that his anxiety and ADHD symptoms have been overall stable and well-controlled on current medications of Lexapro 10mg and Concerta 18mg. Reports that he is tolerating both medications without side effects. Reports that he did run out of Concerta for approx 4 days and noticed recurrence of amotivation and lack of focus. Reports that he is nervous and excited to start at [employer] this fall after transferring from [employer] (major will be history). He is planning to continue working 40-60+ hrs per week at [employer] and has designed his course schedule to accommodate this. He reports that if he needs additional time for studying/coursework, he will seek alternate employment that is less demanding.    Reported current substance use:  Denies nicotine/tobacco use.  Caffeine - Reports current use of 4 cups of coffee/day; energy drinks down to 1/week since starting Concerta  Cannabis - daily via inhalation; flower or concentrate, usually spends $60/couple weeks  Etoh - 1/mo    PSYCHIATRIC REVIEW OF SYSTEMS: current symptoms as reported by pt.  Depression: PHQ9 score of 8 which patient reports is  unchanged from his baseline (see screenings for details). Reports having more good days than bad ones.  Pavithra: Patient denies any change in mood, increased energy, or marked irritability  Anxiety/Panic Attacks: GAD7 score of 5 (see screenings for details). Reports that social anxiety remains well-controlled.  Trauma: Patient reports no signs or symptoms indicative of PTSD  Psychosis: auditory hallucinations  ADHD: See HPI  Sleep: Reports avg of 6-8hrs/night; restful.    REVIEW OF SYSTEMS   Review of Systems   Constitutional: Negative.    Respiratory: Negative.  Negative for shortness of breath.    Cardiovascular: Negative.  Negative for chest pain.   Gastrointestinal: Negative.  Negative for constipation, diarrhea, nausea and vomiting.   Neurological: Negative.  Negative for tingling, weakness and headaches.       PAST PSYCHIATRIC HISTORY  See social hx below    PAST MEDICAL HISTORY  History reviewed. No pertinent past medical history.  No Known Allergies  History reviewed. No pertinent surgical history.     FAMILY HISTORY  Family History   Problem Relation Age of Onset    Depression Mother     Depression Father     Panic attack Father     Alcohol abuse Father     Drug abuse Father     Depression Brother     Panic attack Brother    Bipolar denies  Schizophrenia denies  ADHD denies  Substance use: father in recovery for alcoholism and pain killers    SOCIAL HISTORY  Social History     Socioeconomic History    Marital status: Single   Tobacco Use    Smoking status: Never    Smokeless tobacco: Never   Vaping Use    Vaping status: Never Used   Substance and Sexual Activity    Alcohol use: Yes     Comment: occ    Drug use: Yes     Types: Inhaled     Comment: marijuana   Social History Narrative    Past Psychiatric History:    Denies history of suicide attempt or prior inpatient psychiatry hospitalization.    Prior diagnosis - Anxiety    Provider - Dominique Max at [facility] - x 1.5 years working on  "getting better at socializing, CBT    Prior psychiatric hospitalization - denies    Self harm/suicide attempt - denies    Previous medication trials - denies         Substance Use/Addiction History:    Alcohol - last night, drinks once every 1-2 months    Nicotine - denies    Cannabis - daily after work, helps him unwind. First age 10th grade, heaviest use was 2 yr ago and was smoking all day.     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 Has worked other jobs: , [employer] and side jobs.  Attends [employer] after transferring from [employer], majoring in history, starts in the Fall.  [employer], where he grew up.  AA from [employer].  Unsure of his future career plans, maybe teaching high school or college.  Parents  when he was 15. Brother Kingsley is 19, lives together, working at [employer]. Single. Heterosexual.  No children.  Fa in [place], mom is in [facility]. Does not have any friends, except for his brother, no problems making new friends.  Travels a lot and stays at hostels.     History reviewed. No pertinent surgical history.    PSYCHIATRIC EXAMINATION   There were no vitals taken for this visit.  Musculoskeletal: No abnormal movements noted  Appearance: well-developed, well-nourished, appears stated age, fair hygiene, no apparent distress, and appropriately dressed, cooperative, engaged, friendly, pleasant, and good eye contact  Thought Process:  linear, coherent, and goal-oriented  Abnormal or Psychotic Thoughts: No evidence of auditory or visual hallucinations, and no overt delusions noted  Speech: regular rate, rhythm, volume, tone, and syntax and coherent  Mood: \"good\"  Affect: euthymic and congruent with mood  SI/HI: Denies SI and HI  Orientation: alert and oriented  Recent and Remote Memory: no gross " impairment in immediate, recent, or remote memory  Attention Span and Concentration: grossly intact  Insight/Judgement into symptoms: good  Neurological Testing (MSSE Score and/or clock drawing): MMSE not performed during this encounter      SCREENINGS:      8/5/2024     8:30 AM   Depression Screen (PHQ-2/PHQ-9)   PHQ-2 Total Score 2   PHQ-9 Total Score 8         8/5/2024     8:42 AM    ANNI-7 ANXIETY SCALE FLOWSHEET   Feeling nervous, anxious, or on edge 0   Not being able to stop or control worrying 1   Worrying too much about different things 1   Trouble relaxing 2   Being so restless that it is hard to sit still 0   Becoming easily annoyed or irritable 1   Feeling afraid as if something awful might happen 0   ANNI-7 Total Score 5   How difficult have these problems made it for you to do your work, take care of things at home, or get along with other people? Somewhat difficult       PREVENTATIVE CARE  Medication Monitoring: Stimulants: No concerns.     [place]  records   reviewed.  No concerns about misuse of controlled substance.    CURRENT RISK ASSESSMENT       Suicide: Low       Homicide: Low       Self-Harm: Low       Relapse: Not applicable       Crisis Safety Plan Reviewed Not Indicated    DIAGNOSES/ASSESSMENT/PLAN  Problem List Items Addressed This Visit       ADHD (attention deficit hyperactivity disorder), inattentive type     Problem type: Chronic Illness, Stable    Assessment: 28 year old male with hx of ADHD IT who presents for transfer of care. At last appt, plan was to continue Concerta 18mg which patient has been responding to well and tolerating without side effects. Recent recurrence of sxs following running out of medications. He remains adherent to medications daily. Plan to continue and reassess control of sxs once his first semester at [employer] starts.    Plan  Medication:   -Continue Concerta 18mg PO daily    Therapy:   -Continue biweekly through outside provider    Other Orders:    -Reminded to obtain labwork ordered in April           Relevant Medications    methylphenidate (CONCERTA) 18 MG CR tablet    methylphenidate (CONCERTA) 18 MG CR tablet (Start on 9/4/2024)    Social anxiety disorder     Problem type: Chronic Illness, Stable    Assessment: 28 year old male with hx of social anxiety who presents for transfer of care. Sxs remain well-controlled with Lexapro 10mg and biweekly psychotherapy. Plan to continue both and follow up in 1 mo.    Plan  Medication:   -Continue Lexapro 10mg PO daily    Therapy:   -Continue biweekly psychotherapy with outside provider    Other Orders: None           Relevant Medications    escitalopram (LEXAPRO) 10 MG Tab     Other Visit Diagnoses       Medication refill        Relevant Medications    escitalopram (LEXAPRO) 10 MG Tab    methylphenidate (CONCERTA) 18 MG CR tablet    methylphenidate (CONCERTA) 18 MG CR tablet (Start on 9/4/2024)               Medication options, alternatives (including no medications) and medication risks/benefits/side effects were discussed in detail.  The patient was advised to call, message clinician on [employer], or come in to the clinic if symptoms worsen or if questions/issues regarding their medications arise.  The patient verbalized understanding and agreement.    The patient was educated to call 911, call the suicide hotline, or go to the local ER if having thoughts of suicide or homicide.  The patient verbalized understanding and agreement.   The proposed treatment plan was discussed with the patient who was provided the opportunity to ask questions and make suggestions regarding alternative treatment. Patient verbalized understanding and expressed agreement with the plan.      Return in about 4 weeks (around 9/2/2024).      This appointment was supervised by attending psychiatrist, Amber Pandya M.D. , who agrees with assessment and treatment plan.  See attending attestation for more details.

## 2024-08-05 NOTE — ASSESSMENT & PLAN NOTE
Problem type: Chronic Illness, Stable    Assessment: 28 year old male with hx of social anxiety who presents for transfer of care. Sxs remain well-controlled with Lexapro 10mg and biweekly psychotherapy. Plan to continue both and follow up in 1 mo.    Plan  Medication:   -Continue Lexapro 10mg PO daily    Therapy:   -Continue biweekly psychotherapy with outside provider    Other Orders: None

## 2024-08-05 NOTE — ASSESSMENT & PLAN NOTE
Problem type: Chronic Illness, Stable    Assessment: 28 year old male with hx of ADHD IT who presents for transfer of care. At last appt, plan was to continue Concerta 18mg which patient has been responding to well and tolerating without side effects. Recent recurrence of sxs following running out of medications. He remains adherent to medications daily. Plan to continue and reassess control of sxs once his first semester at [employer] starts.    Plan  Medication:   -Continue Concerta 18mg PO daily    Therapy:   -Continue biweekly through outside provider    Other Orders:   -Reminded to obtain labwork ordered in April

## 2024-09-09 ENCOUNTER — APPOINTMENT (OUTPATIENT)
Dept: BEHAVIORAL HEALTH | Facility: CLINIC | Age: 29
End: 2024-09-09
Payer: COMMERCIAL

## 2024-09-09 DIAGNOSIS — Z76.0 MEDICATION REFILL: ICD-10-CM

## 2024-09-09 RX ORDER — METHYLPHENIDATE HYDROCHLORIDE 18 MG/1
18 TABLET ORAL EVERY MORNING
Qty: 30 TABLET | Refills: 0 | Status: SHIPPED | OUTPATIENT
Start: 2024-09-09 | End: 2024-10-09

## 2024-10-04 ENCOUNTER — APPOINTMENT (OUTPATIENT)
Dept: BEHAVIORAL HEALTH | Facility: CLINIC | Age: 29
End: 2024-10-04